# Patient Record
(demographics unavailable — no encounter records)

---

## 2025-03-10 NOTE — PHYSICAL EXAM
[Appropriately responsive] : appropriately responsive [Alert] : alert [No Acute Distress] : no acute distress [Soft] : soft [Non-tender] : non-tender [Non-distended] : non-distended [No HSM] : No HSM [No Lesions] : no lesions [No Mass] : no mass [Oriented x3] : oriented x3 [Examination Of The Breasts] : a normal appearance [No Masses] : no breast masses were palpable [Vulvar Atrophy] : vulvar atrophy [Labia Majora] : normal [Labia Minora] : normal [Atrophy] : atrophy [Normal] : normal [Uterine Adnexae] : non-palpable [No Tenderness] : no tenderness [Tenderness] : nontender [Enlarged ___ wks] : not enlarged [Mass ___ cm] : no uterine mass was palpated [FreeTextEntry5] : pap not done [FreeTextEntry9] : guaiac-

## 2025-03-10 NOTE — HISTORY OF PRESENT ILLNESS
[Patient reported mammogram was normal] : Patient reported mammogram was normal [Currently Active] : currently active [Men] : men [Vaginal] : vaginal [TextBox_4] : No vb. eating healthier and walking. She takes vit d. Estrogen cream is helping with intercourse, doesn't need renewal at this time. [Mammogramdate] : 2/20225 [TextBox_19] : nml [PapSmeardate] : 1/2023 [BoneDensityDate] : 11/2023 [TextBox_37] : osteopenia fu 2-3 yrs [ColonoscopyDate] : 11/16 [TextBox_43] : due [No] : Patient does not have concerns regarding sex